# Patient Record
(demographics unavailable — no encounter records)

---

## 2025-04-29 NOTE — IMAGING
[de-identified] : LEFT SHOULDER  Inspection: skin intact  Palpation: tenderness is noted   Range of motion: in sling  Strength: pain with testing  Neurological testing: motor and sensor intact distally.

## 2025-04-29 NOTE — DISCUSSION/SUMMARY
[de-identified] : 31 yo F with displaced clavicle fracture and greater tuberosity fracture discussed operative and non-operative tx recommend operative intervention, however, I am going out of town and do not have time to fix this I have sent around her info to see if any of my partners are available as well as to urgent appts [place], if no one is available that takes her insurance we also gave her [facility] fast track number. FU as needed

## 2025-04-29 NOTE — DATA REVIEWED
[FreeTextEntry1] : 4 v shoulder midshaft clavicle fx displaced, comminuted greater tuberosity fracture  CT scan of left shoulder 4/22/2025 [facility] impression: comminuted fracture of the greater tuberosity of left humeral head.  comminuted fracture of the mid left clavicle.  No glenohumeral joint dislocation, or left AC joint separation musculature appears unremarkable

## 2025-04-29 NOTE — HISTORY OF PRESENT ILLNESS
[de-identified] : Apr 29, 2025 Date of Injury/Onset: 04/10/2025 Pain: At Rest: With Activity: Mechanism of injury: DARLENE, presents to office [place] with left arm sling, here for left shoulder pain since 4/10/2025.  symptoms started after patient fell off a friend leading patient to hit ground with her left shoulder Patient visited [facility], had x-rays & CT-scan done, she was prescribed oxycodone and was sent home with a sling.  Patient denies N/T, swelling, decreased ROM.  Pain indicated to all aspects of left shoulder, 5/10, intermittent, achy and sharp at times.  worsening with certain arm/body motions to point affecting quality of life.  Some relief with rest, arm sling.  PMHx denies any medical Hx.   This is NOT a Work Related Injury being treated under Worker's Compensation This is NOT an athletic injury occurring associated with an interscholastic or organized sports team. Quality of symptoms:  Improves with: Worse with:  Prior treatment: sling Prior Imaging: CT- scan Reports Available For Review Today: report Out of work/sport: working School/Sport/Position/Occupation:

## 2025-05-08 NOTE — DISCUSSION/SUMMARY
[de-identified] : 30-year-old woman with a left clavicle fracture and left greater tuberosity fracture, approximately 4 weeks out  -The risks and benefits of operative versus nonoperative management were discussed at length with the patient. The patient shows a good understanding of the injury and treatment options. They would like to move forward with operative management.  -NWB left UE -Ice and Advil for pain -Schedule surgery for 5/12/25 or 5/13/25 -Follow-up in the office 2 weeks after surgery.  -All of the patient's questions and concerns were addressed.

## 2025-05-08 NOTE — DISCUSSION/SUMMARY
[de-identified] : 30-year-old woman with a left clavicle fracture and left greater tuberosity fracture, approximately 4 weeks out  -The risks and benefits of operative versus nonoperative management were discussed at length with the patient. The patient shows a good understanding of the injury and treatment options. They would like to move forward with operative management.  -NWB left UE -Ice and Advil for pain -Schedule surgery for 5/12/25 or 5/13/25 -Follow-up in the office 2 weeks after surgery.  -All of the patient's questions and concerns were addressed.

## 2025-05-08 NOTE — HISTORY OF PRESENT ILLNESS
[de-identified] : Ms. ADA SAWANT is a 30 year old RHD woman presents today for initial evaluation of a left shoulder injury sustained on 4/10/25. She was initially seen at [facility] where x-rays and a CT scan were performed. She was seen by Dr. Rivera on 4/29/25 who performed x-rays of the left scapula and left shoulder.   Works as a /

## 2025-05-08 NOTE — PHYSICAL EXAM
[de-identified] : Ms. ADA SAWANT is sitting comfortably in the exam room  LEFT shoulder -Skin is intact, no swelling, no ecchymosis -ROM limited due to pain -Able to make a fist -Sensation is intact median, radial, ulnar, axillary nerves -Motor is intact median, radial, ulnar, axillary nerves -Hand is warm and well-perfused, Palpable radial and ulnar pulses  [de-identified] : X-rays of the left shoulder (2 views) and left scapula (2 views) were performed at [facility] on 4/29/25.   CT scan left shoulder was performed at [facility] on 4/22/25  X-rays and CT scan show a comminuted clavicle fracture on the left side and a displaced comminuted greater tuberosity fracture on the left proximal humerus.

## 2025-05-08 NOTE — HISTORY OF PRESENT ILLNESS
[de-identified] : Ms. ADA SAWANT is a 30 year old RHD woman presents today for initial evaluation of a left shoulder injury sustained on 4/10/25. She was initially seen at [facility] where x-rays and a CT scan were performed. She was seen by Dr. Rivera on 4/29/25 who performed x-rays of the left scapula and left shoulder.   Works as a /

## 2025-05-08 NOTE — PHYSICAL EXAM
[de-identified] : Ms. ADA SAWANT is sitting comfortably in the exam room  LEFT shoulder -Skin is intact, no swelling, no ecchymosis -ROM limited due to pain -Able to make a fist -Sensation is intact median, radial, ulnar, axillary nerves -Motor is intact median, radial, ulnar, axillary nerves -Hand is warm and well-perfused, Palpable radial and ulnar pulses  [de-identified] : X-rays of the left shoulder (2 views) and left scapula (2 views) were performed at [facility] on 4/29/25.   CT scan left shoulder was performed at [facility] on 4/22/25  X-rays and CT scan show a comminuted clavicle fracture on the left side and a displaced comminuted greater tuberosity fracture on the left proximal humerus.

## 2025-05-22 NOTE — HISTORY OF PRESENT ILLNESS
[de-identified] : Ms. ADA SAWANT is a 30 year old woman presents today for post-op appointment s/p ORIF left clavicle and ORIF left greater tuberosity on 5/12/25. Since her surgery she states she is feeling better. She has been working on some range of motion exercises for the left arm at home. She has been applying ice for the pain with moderate relief.

## 2025-05-22 NOTE — DISCUSSION/SUMMARY
[de-identified] : 30-year-old woman s/p ORIF left clavicle and ORIF left greater tuberosity, approximately 10 days out  -X-ray from the surgery and physical exam findings were discussed with the patient -JD left UE -Discontinue sling -Physical therapy: work on PROM left shoulder -Follow up in 4 weeks with x-rays of the left clavicle and left shoulder at that time. -All the patient's questions and concerns were addressed during this visit

## 2025-05-22 NOTE — REASON FOR VISIT
[Post Operative Visit] : a post operative visit for [FreeTextEntry2] : s/p ORIF left clavicle and ORIF left greater tuberosity, DOS: 5/12/25

## 2025-05-22 NOTE — PHYSICAL EXAM
[de-identified] : Ms. ADA SAWANT is sitting comfortably in the exam room  LEFT shoulder -Skin is intact, mild swelling, no ecchymosis -Incision is clean and dry, no erythema, no signs of infection -Passive FE:45 , ER:5 , IR: Hip , Passive ABD: 15 -Able to make a fist -Sensation is intact median, radial, ulnar, axillary nerves -Motor is intact median, radial, ulnar, axillary nerves -Hand is warm and well-perfused, Palpable radial and ulnar pulses  [de-identified] : No Xrays were taken in the office today, 5/22/25

## 2025-05-22 NOTE — HISTORY OF PRESENT ILLNESS
[de-identified] : Ms. ADA SAWANT is a 30 year old woman presents today for post-op appointment s/p ORIF left clavicle and ORIF left greater tuberosity on 5/12/25. Since her surgery she states she is feeling better. She has been working on some range of motion exercises for the left arm at home. She has been applying ice for the pain with moderate relief.

## 2025-05-22 NOTE — DISCUSSION/SUMMARY
[de-identified] : 30-year-old woman s/p ORIF left clavicle and ORIF left greater tuberosity, approximately 10 days out  -X-ray from the surgery and physical exam findings were discussed with the patient -JD left UE -Discontinue sling -Physical therapy: work on PROM left shoulder -Follow up in 4 weeks with x-rays of the left clavicle and left shoulder at that time. -All the patient's questions and concerns were addressed during this visit

## 2025-05-22 NOTE — PHYSICAL EXAM
[de-identified] : Ms. ADA SAWANT is sitting comfortably in the exam room  LEFT shoulder -Skin is intact, mild swelling, no ecchymosis -Incision is clean and dry, no erythema, no signs of infection -Passive FE:45 , ER:5 , IR: Hip , Passive ABD: 15 -Able to make a fist -Sensation is intact median, radial, ulnar, axillary nerves -Motor is intact median, radial, ulnar, axillary nerves -Hand is warm and well-perfused, Palpable radial and ulnar pulses  [de-identified] : No Xrays were taken in the office today, 5/22/25

## 2025-06-19 NOTE — HISTORY OF PRESENT ILLNESS
[de-identified] : Ms. ADA SAWANT is a 30 year old woman presents today for post-op appointment s/p ORIF left clavicle and ORIF left greater tuberosity on 5/12/25. Since her last visit she states she is feeling better. She is participating in PT twice a week.

## 2025-06-19 NOTE — DISCUSSION/SUMMARY
[de-identified] : 30-year-old woman s/p ORIF left clavicle and ORIF left greater tuberosity, approximately 5.5 weeks out  -X-ray from the surgery and physical exam findings were discussed with the patient -5lb weight limit left UE -Physical therapy:  -Follow up in 2 months with x-rays of the left clavicle and left shoulder at that time. -All the patient's questions and concerns were addressed during this visit

## 2025-06-19 NOTE — DISCUSSION/SUMMARY
[de-identified] : 30-year-old woman s/p ORIF left clavicle and ORIF left greater tuberosity, approximately 5.5 weeks out  -X-ray from the surgery and physical exam findings were discussed with the patient -5lb weight limit left UE -Physical therapy:  -Follow up in 2 months with x-rays of the left clavicle and left shoulder at that time. -All the patient's questions and concerns were addressed during this visit

## 2025-06-19 NOTE — PHYSICAL EXAM
[de-identified] : Ms. ADA SAWANT is sitting comfortably in the exam room  LEFT shoulder -Skin is intact, no swelling, no ecchymosis -Incision is clean and dry, no erythema, no signs of infection -FE:130, ER: 10 , IR: L5, ABD: 90 -Able to make a fist -Sensation is intact median, radial, ulnar, axillary nerves -Motor is intact median, radial, ulnar, axillary nerves -Hand is warm and well-perfused, Palpable radial and ulnar pulses  [de-identified] :  X-rays of the left clavicle were taken in the office today, 6/19/25. 2 views.    X-rays of the left shoulder were taken in the office today, 6/19/25. AP, Scap Y, Axillary.

## 2025-06-19 NOTE — HISTORY OF PRESENT ILLNESS
[de-identified] : Ms. ADA SAWANT is a 30 year old woman presents today for post-op appointment s/p ORIF left clavicle and ORIF left greater tuberosity on 5/12/25. Since her last visit she states she is feeling better. She is participating in PT twice a week.

## 2025-06-19 NOTE — PHYSICAL EXAM
[de-identified] : Ms. ADA SAWANT is sitting comfortably in the exam room  LEFT shoulder -Skin is intact, no swelling, no ecchymosis -Incision is clean and dry, no erythema, no signs of infection -FE:130, ER: 10 , IR: L5, ABD: 90 -Able to make a fist -Sensation is intact median, radial, ulnar, axillary nerves -Motor is intact median, radial, ulnar, axillary nerves -Hand is warm and well-perfused, Palpable radial and ulnar pulses  [de-identified] :  X-rays of the left clavicle were taken in the office today, 6/19/25. 2 views.    X-rays of the left shoulder were taken in the office today, 6/19/25. AP, Scap Y, Axillary.